# Patient Record
Sex: FEMALE | URBAN - METROPOLITAN AREA
[De-identification: names, ages, dates, MRNs, and addresses within clinical notes are randomized per-mention and may not be internally consistent; named-entity substitution may affect disease eponyms.]

---

## 2017-07-27 ENCOUNTER — IMPORTED ENCOUNTER (OUTPATIENT)
Dept: URBAN - METROPOLITAN AREA CLINIC 9 | Facility: CLINIC | Age: 77
End: 2017-07-27

## 2018-01-25 ENCOUNTER — IMPORTED ENCOUNTER (OUTPATIENT)
Dept: URBAN - METROPOLITAN AREA CLINIC 9 | Facility: CLINIC | Age: 78
End: 2018-01-25

## 2018-05-22 ENCOUNTER — IMPORTED ENCOUNTER (OUTPATIENT)
Dept: URBAN - METROPOLITAN AREA CLINIC 9 | Facility: CLINIC | Age: 78
End: 2018-05-22

## 2018-07-12 ENCOUNTER — IMPORTED ENCOUNTER (OUTPATIENT)
Dept: URBAN - METROPOLITAN AREA CLINIC 9 | Facility: CLINIC | Age: 78
End: 2018-07-12

## 2019-02-01 NOTE — PROCEDURE NOTE: CLINICAL
PROCEDURE NOTE: Eylea 2mg (1 of 3) #3 OD. Diagnosis: Neovascular AMD with Active CNV. Prior to injection, risks/benefits/alternatives discussed including corneal abrasion, infection, loss of vision, hemorrhage, cataract, glaucoma, retinal tears or detachment. A written consent is on file, and the need for today’s injection was discussed and the patient is understanding and wishes to proceed. The entire vial of Eylea was drawn up into a syringe. The opened vial, remaining drug, and filtered needle were disposed of in a certified biohazard container. Betadine prep was performed. Topical anesthesia was induced with Alcaine. 4% lidocaine pledge. A lid speculum was used. A short 30g needle on a 1cc syringe was used with product that that had previously been prepared under sterile conditions. Injection site: 3-4 mm from the limbus. The used syringe/needle was transferred to a biohazard container. Lid speculum removed. Mask worn during procedure. Patient tolerated procedure well. Count fingers vision was verified. There were no complications. Patient was given the standard instruction sheet. Patient given office phone number/answering service number and advised to call immediately should there be loss of vision or pain, or should they have any other questions or concerns. Lot [id number], exp 6/30/19.

## 2019-02-01 NOTE — PATIENT DISCUSSION
Pt receiving treatments in [place].  Decision to treat was made based on today's clinical findings.  Pt edu still has fluid today.  Recommended Eylea #3 today.  Series of 3 x 4 weeks apart. Pt agrees to proceed with treatment today. The injection was administered without complication. Post-injection instructions were reviewed and understood by the patient.

## 2019-02-28 NOTE — PATIENT DISCUSSION
Chinmay Sample #4  (2 of 3)injection recommended today after discussion of benefits, risks, alternatives. The injection was administered without complication. Post-injection instructions were reviewed and understood by the patient.

## 2019-02-28 NOTE — PROCEDURE NOTE: CLINICAL
PROCEDURE NOTE: Eylea Sample (2 of 3) #4 OD. Diagnosis: Neovascular AMD with Active CNV. Anesthesia: Topical. Prep: Betadine Drops and Betadine Scrub. Prior to injection, risks/benefits/alternatives discussed including corneal abrasion, infection, loss of vision, hemorrhage, cataract, glaucoma, retinal tears or detachment. A written consent is on file, and the need for today’s injection was discussed and the patient is understanding and wishes to proceed. The entire vial of Eylea was drawn up into a syringe. The opened vial, remaining drug, and filtered needle were disposed of in a certified biohazard container. Betadine prep was performed. Topical anesthesia was induced with Alcaine. 4% lidocaine pledge. A lid speculum was used. A short 30g needle on a 1cc syringe was used with product that that had previously been prepared under sterile conditions. Injection site: 3-4 mm from the limbus. The used syringe/needle was transferred to a biohazard container. Lid speculum removed. Mask worn during procedure. Patient tolerated procedure well. Count fingers vision was verified. There were no complications. Patient was given the standard instruction sheet. Patient given office phone number/answering service number and advised to call immediately should there be loss of vision or pain, or should they have any other questions or concerns. Aditi Duran

## 2019-03-28 NOTE — PROCEDURE NOTE: CLINICAL
PROCEDURE NOTE: Eylea 2mg (3 of 3) #5 OD. Diagnosis: Neovascular AMD with Active CNV. Prior to injection, risks/benefits/alternatives discussed including corneal abrasion, infection, loss of vision, hemorrhage, cataract, glaucoma, retinal tears or detachment. A written consent is on file, and the need for today’s injection was discussed and the patient is understanding and wishes to proceed. The entire vial of Eylea was drawn up into a syringe. The opened vial, remaining drug, and filtered needle were disposed of in a certified biohazard container. Betadine prep was performed. Topical anesthesia was induced with Alcaine. 4% lidocaine pledge. A lid speculum was used. A short 30g needle on a 1cc syringe was used with product that that had previously been prepared under sterile conditions. Injection site: 3-4 mm from the limbus. The used syringe/needle was transferred to a biohazard container. Lid speculum removed. Mask worn during procedure. Patient tolerated procedure well. Count fingers vision was verified. There were no complications. Patient was given the standard instruction sheet. Patient given office phone number/answering service number and advised to call immediately should there be loss of vision or pain, or should they have any other questions or concerns. [facility]#[id number] EXP 09/2019.

## 2019-03-28 NOTE — PATIENT DISCUSSION
Pt seeing Dr. Roberto Ford in [facility]. Ph # [phone] Fax: [phone].

## 2019-03-28 NOTE — PATIENT DISCUSSION
Chinmay #5  (3 of 3)injection recommended today after discussion of benefits, risks, alternatives. The injection was administered without complication. Post-injection instructions were reviewed and understood by the patient.

## 2019-03-28 NOTE — PATIENT DISCUSSION
Pt not returning to [facility]. Will be seeing Dr. Aristeo Mcconnell in [facility].

## 2021-10-17 ASSESSMENT — VISUAL ACUITY
OS_SC: 20/30 SN
OS_SC: 20/25 SN
OD_SC: 20/20 SN
OS_CC: 20/20 - SN
OD_CC: 20/20 SN
OD_CC: 20/25 SN
OD_SC: 20/25 SN
OS_CC: 20/30 SN
OS_CC: 20/25 + SN
OS_CC: 20/25 SN
OD_CC: 20/40 SN
OD_CC: 20/20 SN

## 2021-10-17 ASSESSMENT — KERATOMETRY
OS_K1POWER_DIOPTERS: 43
OS_K2POWER_DIOPTERS: 43.5
OS_AXISANGLE2_DEGREES: 89
OS_K1POWER_DIOPTERS: 43
OD_K2POWER_DIOPTERS: 43.25
OD_K1POWER_DIOPTERS: 43
OD_AXISANGLE2_DEGREES: 90
OD_AXISANGLE2_DEGREES: 155
OD_K2POWER_DIOPTERS: 43.25
OS_AXISANGLE_DEGREES: 16
OS_K2POWER_DIOPTERS: 43.5
OS_AXISANGLE2_DEGREES: 106
OD_AXISANGLE_DEGREES: 65
OS_AXISANGLE_DEGREES: 179
OD_K1POWER_DIOPTERS: 43.25
OD_AXISANGLE_DEGREES: 180

## 2021-10-17 ASSESSMENT — PACHYMETRY
OD_CT_UM: 557.0
OS_CT_UM: 563.0

## 2021-10-17 ASSESSMENT — TONOMETRY
OD_IOP_MMHG: 13
OS_IOP_MMHG: 12
OD_IOP_MMHG: 13
OS_IOP_MMHG: 12
OD_IOP_MMHG: 12
OS_IOP_MMHG: 10
